# Patient Record
Sex: FEMALE | Race: WHITE
[De-identification: names, ages, dates, MRNs, and addresses within clinical notes are randomized per-mention and may not be internally consistent; named-entity substitution may affect disease eponyms.]

---

## 2017-01-04 ENCOUNTER — HOSPITAL ENCOUNTER (EMERGENCY)
Dept: HOSPITAL 10 - FTE | Age: 47
LOS: 1 days | Discharge: HOME | End: 2017-01-05
Payer: MEDICAID

## 2017-01-04 VITALS
HEIGHT: 62 IN | HEIGHT: 62 IN | WEIGHT: 153.22 LBS | BODY MASS INDEX: 28.2 KG/M2 | BODY MASS INDEX: 28.2 KG/M2 | WEIGHT: 153.22 LBS

## 2017-01-04 DIAGNOSIS — H66.91: Primary | ICD-10-CM

## 2017-01-04 PROCEDURE — 99283 EMERGENCY DEPT VISIT LOW MDM: CPT

## 2017-01-04 NOTE — ERD
ER Documentation


Chief Complaint


Date/Time


DATE: 1/4/17


Chief Complaint


Ear pain





HPI


The patient is a 46-year-old female who presents to the Emergency Department 

with complaint of bilateral ear pain.  She reports that her symptoms began 2.5 

weeks ago, with onset of right-sided ear pain, mild rhinorrhea, body aches, 

subjective tactile fevers, sore throat and increased eye lacrimation.  Since, 

her fevers, body aches and sore throat have improved.  However, she continues 

to experience right-sided ear pain, rhinorrhea and increased eye lacrimation.  

Two days ago, the patient also developed left ear pain.  She denies any cough. 

Denies otorrhea or bloody discharge.  Denies any pain to the external ear.  

Denies any large water exposure. Denies any change in hearing or dizziness.  

Denies neck pain/neck stiffness. Denies new rashes. Denies any sick contacts. 

The patient does state that she has had similar symptoms in the past, which 

resolved after taking antibiotics.





ROS


All systems reviewed and are negative except as per history of present illness.





Medications


Home Meds


Active Scripts


Loratadine* (Loratadine*) 10 Mg Tablet, 10 MG PO DAILY, #30 TAB


   Prov:ROMEL ASHER PA-C         1/4/17


Ibuprofen* (Motrin*) 600 Mg Tab, 600 MG PO Q6, #30 TAB


   Prov:ROMEL ASHER PA-C         1/4/17


Amoxicillin* (Amoxicillin*) 500 Mg Cap, 500 MG PO BID, #20 CAP


   Prov:ROMEL ASHER PA-C         1/4/17


Amoxicillin* (Amoxicillin*) 500 Mg Cap, 500 MG PO TID for 7 Days, CAP


   Prov:OMARI GARCIA PA-C         11/15/16


Phenylephrine/Dm/Acetaminop/GG (Sudafed PE Pressure+Pain+Cold) 1 Each Tablet, 1 

EACH PO BID, #10 TAB


   Prov:BRITTANY MELENDEZ NP         12/8/15


Dextromethorphan Hb-Promethazine Hcl (Promethazine DM Syrup) 180 Ml Syrup, 5 ML 

PO Q6H Y for COUGH, #4 OZ


   Prov:ISAURO CAPPS         9/23/15





Allergies


Allergies:  


Coded Allergies:  


     No Known Allergy (Unverified , 11/15/16)





PMhx/Soc


Hx Alcohol Use:  No


Hx Substance Use:  No


Hx Tobacco Use:  No





Physical Exam


Vitals





Vital Signs








  Date Time  Temp Pulse Resp B/P Pulse Ox O2 Delivery O2 Flow Rate FiO2


 


1/4/17 15:42 98.2 90 16 160/80 99   








Physical Exam


GENERAL: Well-developed, well-nourished, in no acute distress


HEENT: Head is normocephalic, atraumatic. No scleral pallor or icterus. Pupils 

equal, round and reactive to light. Extraocular movements intact. Conjunctiva 

pink. Nares are patent bilaterally. Right tympanic membrane is erythematous, 

with dulling of the light reflex, and mildly bulging.  Left tympanic membrane 

is clear, with dulling of the light reflex, but no significant erythema, no 

effusion.  No tenderness to palpation or manipulation of the tragus or pinna.  

No foreign bodies. No mastoid or preauricular tenderness. No otorrhea or bloody 

discharge. Moist mucous membranes. No pharyngeal erythema or exudates. 


NECK: Supple. No masses, no tenderness, no lymphadenopathy. Full range of 

motion.  No nuchal rigidity.


RESPIRATORY: Lungs are clear to auscultation bilaterally.


CARDIOVASCULAR:  Regular rate and rhythm. S1 and S2 normal.


EXTREMITIES: No clubbing, cyanosis, or edema. Distal pulses are palpable, 2+ 

bilaterally. Capillary refill is less than 2 seconds.


MUSCULOSKELETAL: No injury or deformities.


NEUROLOGIC: The patient is alert, awake, and oriented x 3. No focal neurologic 

deficits.  Gait is observed and normal.


INTEGUMENT:  Skin is clean, dry and intact.


PSYCHIATRIC:  Appropriate; Cooperative.





Procedures/MDM


This is a 46-year-old female presenting to the Emergency Department with 

complaint of bilateral ear pain.  On physical examination, the patients right 

tympanic membrane was erythematous and bulging.  Left tympanic membrane was 

noted to have dulling of the light reflex, though with no erythema or effusion. 

She had no mastoid tenderness, no preauricular tenderness.  Hearing is grossly 

intact.  No otorrhea or bloody discharge.  No tenderness to palpation or 

manipulation of tragus or pinna.  No foreign bodies were noted.  The 

differential diagnosis includes, but is not limited to, otitis media, otitis 

externa, ear foreign body, cerumen impaction, ruptured tympanic membrane, 

sinusitis, URI, tinnitus, mastoiditis, viral syndrome, cholesteatoma, auditory 

tube dysfunction, osteoma, referred pain, trauma, bullous myringitis, Mableton-

Hunt syndrome.  After rest, the patient has no new complaints.  





Upon my review and interpretation of the patient's presentation and overall ER 

course, I believe the patient's symptoms are most consistent with acute right 

otitis media, and left otalgia without evidence of infection.  At this time, 

the patient is in stable condition and therefore can be discharged home with a 

prescription for Amoxicillin, ibuprofen and Loratadine, and strict return 

precautions for signs of deteriorating or worsening condition.  The patient is 

instructed to follow up with her primary medical provider and an ENT specialist 

within 2-3 days for reevaluation and further management or to return to the ER 

sooner for any new or worsening symptoms.  Given that the patient has had 

multiple ear infections over the past several years, ENT referrals were 

provided.  I shared my medical decision making and plan with the patient at 

length and in great detail, and she verbally understands and agrees with the 

plan for further observation and care as an outpatient.  At the time of 

discharge, all questions were answered.





Departure


Diagnosis:  


 Primary Impression:  


 Acute right otitis media


 Additional Impression:  


 Otalgia of both ears


Condition:  Stable


Patient Instructions:  Anatomy of the Ear, Anatomy of the Inner Ear, Earache W/

O Infection (Adult), Otitis Media, Abx Tx (Adult)


Referrals:  


IDALMIS MESA MD, STEPHEN H MD





Additional Instructions:  


Follow up with your primary medical provider and an ENT specialist within 2-3 

days for reevaluation and further management. Return to the ED sooner for any 

new or worsening symptoms.











ROMEL ASHER PA-C Jan 4, 2017 16:39

## 2017-02-21 ENCOUNTER — HOSPITAL ENCOUNTER (EMERGENCY)
Dept: HOSPITAL 10 - FTE | Age: 47
Discharge: HOME | End: 2017-02-21
Payer: MEDICAID

## 2017-02-21 VITALS — TEMPERATURE: 98.2 F | HEART RATE: 68 BPM | SYSTOLIC BLOOD PRESSURE: 178 MMHG | DIASTOLIC BLOOD PRESSURE: 93 MMHG

## 2017-02-21 VITALS — BODY MASS INDEX: 34.59 KG/M2 | WEIGHT: 149.47 LBS | HEIGHT: 55 IN

## 2017-02-21 DIAGNOSIS — I10: ICD-10-CM

## 2017-02-21 DIAGNOSIS — R51: Primary | ICD-10-CM

## 2017-02-21 LAB
ANION GAP SERPL CALC-SCNC: 16 MMOL/L (ref 8–16)
APTT BLD: 22.7 SEC (ref 25–35)
BASOPHILS # BLD AUTO: 0.1 10^3/UL (ref 0–0.1)
BASOPHILS NFR BLD: 0.8 % (ref 0–2)
BUN SERPL-MCNC: 12 MG/DL (ref 7–20)
CALCIUM SERPL-MCNC: 8.7 MG/DL (ref 8.4–10.2)
CHLORIDE SERPL-SCNC: 103 MMOL/L (ref 97–110)
CO2 SERPL-SCNC: 27 MMOL/L (ref 21–31)
CONDITION: 1
CREAT SERPL-MCNC: 0.7 MG/DL (ref 0.44–1)
EOSINOPHIL # BLD: 0 10^3/UL (ref 0–0.5)
EOSINOPHIL NFR BLD: 0.1 % (ref 0–7)
ERYTHROCYTE [DISTWIDTH] IN BLOOD BY AUTOMATED COUNT: 14 % (ref 11.5–14.5)
GLUCOSE SERPL-MCNC: 140 MG/DL (ref 70–220)
HCT VFR BLD CALC: 41.9 % (ref 37–47)
HGB BLD-MCNC: 14.2 G/DL (ref 12–16)
INR PPP: 0.99
LYMPHOCYTES # BLD AUTO: 3.4 10^3/UL (ref 0.8–2.9)
LYMPHOCYTES NFR BLD AUTO: 21.8 % (ref 15–51)
MCH RBC QN AUTO: 31.3 PG (ref 29–33)
MCHC RBC AUTO-ENTMCNC: 33.9 G/DL (ref 32–37)
MCV RBC AUTO: 92.4 FL (ref 82–101)
MONOCYTES # BLD: 0.8 10^3/UL (ref 0.3–0.9)
MONOCYTES NFR BLD: 4.9 % (ref 0–11)
NEUTROPHILS # BLD: 11.2 10^3/UL (ref 1.6–7.5)
NEUTROPHILS NFR BLD AUTO: 72.4 % (ref 39–77)
NRBC # BLD MANUAL: 0 10^3/UL (ref 0–0)
NRBC BLD QL: 0 /100WBC (ref 0–0)
PLATELET # BLD: 246 10^3/UL (ref 140–440)
PMV BLD AUTO: 9.1 FL (ref 7.4–10.4)
POTASSIUM SERPL-SCNC: 3.3 MMOL/L (ref 3.5–5.1)
PROTHROMBIN TIME: 13.1 SEC (ref 12.2–14.2)
PT RATIO: 1
RBC # BLD AUTO: 4.54 10^6/UL (ref 4.2–5.4)
SODIUM SERPL-SCNC: 143 MMOL/L (ref 135–144)
WBC # BLD AUTO: 15.4 10^3/UL (ref 4.8–10.8)
WBC # BLD: 15.4 10^3/UL (ref 4.8–10.8)

## 2017-02-21 PROCEDURE — 96375 TX/PRO/DX INJ NEW DRUG ADDON: CPT

## 2017-02-21 PROCEDURE — 70552 MRI BRAIN STEM W/DYE: CPT

## 2017-02-21 PROCEDURE — 80048 BASIC METABOLIC PNL TOTAL CA: CPT

## 2017-02-21 PROCEDURE — 96361 HYDRATE IV INFUSION ADD-ON: CPT

## 2017-02-21 PROCEDURE — 96374 THER/PROPH/DIAG INJ IV PUSH: CPT

## 2017-02-21 PROCEDURE — 85610 PROTHROMBIN TIME: CPT

## 2017-02-21 PROCEDURE — 70551 MRI BRAIN STEM W/O DYE: CPT

## 2017-02-21 PROCEDURE — 85025 COMPLETE CBC W/AUTO DIFF WBC: CPT

## 2017-02-21 PROCEDURE — 70450 CT HEAD/BRAIN W/O DYE: CPT

## 2017-02-21 PROCEDURE — 85730 THROMBOPLASTIN TIME PARTIAL: CPT

## 2017-02-21 NOTE — RADRPT
PROCEDURE:   MRI Head with intravenous gadolinium

 

CLINICAL INDICATION:   Headache

 

TECHNIQUE:   Multiplanar multi sequence imaging was obtained through the head following intravenous 
gadolinium administration.

 

COMPARISON:   

Left facial palsy, and 13 mm lesions seen on previous noncontrast MRI

 

FINDINGS:

The ventricles are normal in size and there is no midline shift.

There is been no significant interval change to the 1.2 by 1.1 x 0.8 cm mass seen within the left la
teral sella which appears to bulge into the left cavernous sinus.  No significant post gadolinium en
hancement evident.  No other mass, no intracranial bleed and no extra-axial fluid collection is evid
ent.

The brainstem appears unremarkable.

Normal enhancement is seen within the central cerebral vasculature and within the dural sinuses and 
deep veins.

The visualized osseous elements appear unremarkable.

The orbits and orbital contents appear unremarkable.

The paranasal sinuses and mastoid air cells are well-aerated.

 

IMPRESSION: 

1.  Since the previous noncontrast study done earlier the same date there is been no significant int
erval change to the 1.2 x 1.1 x 0.8 cm mass seen within the left lateral sella and extending superio
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
ents a Rathke cleft cyst.  A pituitary adenoma is less likely.

2.  The mass bulges into the left cavernous sinus but the left carotid artery remains patent.

3.  Otherwise, stable and unremarkable MRI of the brain.

_____________________________________________ 

Physician Arvin           Date    Time 

Electronically viewed and signed by Physician Arvin on 02/21/2017 20:13 

 

D:  02/21/2017 20:13  T:  02/21/2017 20:13

/

## 2017-02-21 NOTE — RADRPT
PROCEDURE:  MRI Brain without contrast. 

 

CLINICAL INDICATION:  Headaches, left facial palsy 

 

TECHNIQUE:  Multiplanar MRI of the brain without contrast was performed on a 3.0 T scanner with the 
following sequences obtained:  T1-weighted, T2-weighted/FLAIR, diffusion weighted (with ADC map), GR
E.  

 

COMPARISON:  CT brain 02/21/2017 

 

FINDINGS:

No acute/recent ischemic infarction or intracranial hemorrhage / blood degradation products are iden
tified.  No extra-axial fluid collection is seen.  

 

There is no mass effect.  No midline shift is identified.

 

The ventricles and sulci are within normal limits for size and configuration.

 

A few punctate foci of increased T2-weighted FLAIR signal intensity are identified in the deep white
 matter which are nonspecific, which may reflect chronic small vessel ischemic changes or possibly s
equela of complicated migraines.

 

There is a focal homogeneous intermediate T1-weighted, decreased T2-weighted signal intensity lesion
 in the left aspect of the sella which may extend slightly into the left suprasellar cistern measuri
ng approximately 7 x 13 x 10 mm (AP x transverse x CC).  No associated fluid fluid level is seen.

 

Flow voids are identified in the proximal intracranial arteries and dural sinuses suggesting patency
.  

 

The mastoid air cells and paranasal sinuses are grossly clear.  

 

 

IMPRESSION:

1.  No evidence of acute intracranial pathology.

2.  Minimal nonspecific white matter changes, which may reflect chronic small vessel ischemic change
s, possibly sequela of complicated migraines.

3.  13 mm intermediate T1-weighted/decreased T2-weighted signal intensity lesion in the left sella w
hich may extend slightly into the suprasellar cistern.  Considerations include Rathke cleft cyst, le
ss likely hemorrhagic pituitary adenoma, but further characterization could be performed with follow
-up dedicated contrast-enhanced MRI of the pituitary.

 

 

RPTAT: TT

_____________________________________________ 

.Kal White MD, MD           Date    Time 

Electronically viewed and signed by .Kal White MD, MD on 02/21/2017 17:01 

 

D:  02/21/2017 17:01  T:  02/21/2017 17:01

.O/

## 2017-02-21 NOTE — ERD
ER Documentation


Chief Complaint


Date/Time


DATE: 2/21/17 


TIME: 19:34


Chief Complaint


non traumatic headache no fever since 5 days.L side facial palsy, on meds





HPI


A 46-year-old female with a past medical history of hypertension presents to 

the ED complaining of positional dizziness that led to a headache that started 

5 days ago.  States that she went to the [facility] and Dr. French diagnosed patient with a left-sided Bell's palsy.  Patient reports 

that she has been taking acyclovir, methylprednisolone, diclofenac, artificial 

tears without relief of her symptoms.  Reports that her headache is mainly in 

the frontal region and radiates to the back.  Rates the pain a 8 out of 10.  

Describes it as achy.  States that the pain is worse at night.  Reports that 

the pain is exacerbated when she bends forward.  Denies any facial pain.  

Denies any sinus pain.  Denies any photophobia, blurred vision, photophobia, 

weakness, abdominal pain, nausea, vomiting, chest pain, shortness of breath.  

Reports that her last menses was on February 14, 2017.





ROS


All systems reviewed and are negative except as per history of present illness.





Medications


Home Meds


Active Scripts


Loratadine* (Loratadine*) 10 Mg Tablet, 10 MG PO DAILY, #30 TAB


   Prov:RMOEL ASHER PA-C         1/4/17


Ibuprofen* (Motrin*) 600 Mg Tab, 600 MG PO Q6, #30 TAB


   Prov:ROMEL ASHER PA-C         1/4/17


Amoxicillin* (Amoxicillin*) 500 Mg Cap, 500 MG PO BID, #20 CAP


   Prov:ROMEL ASHER PA-C         1/4/17


Amoxicillin* (Amoxicillin*) 500 Mg Cap, 500 MG PO TID for 7 Days, CAP


   Prov:OMARI GARCIA PA-C         11/15/16


Phenylephrine/Dm/Acetaminop/GG (Sudafed PE Pressure+Pain+Cold) 1 Each Tablet, 1 

EACH PO BID, #10 TAB


   Prov:BRITTANY MELENDEZ NP         12/8/15


Dextromethorphan Hb-Promethazine Hcl (Promethazine DM Syrup) 180 Ml Syrup, 5 ML 

PO Q6H Y for COUGH, #4 OZ


   Prov:ISAURO CAPPS         9/23/15





Allergies


Allergies:  


Coded Allergies:  


     No Known Allergy (Unverified , 11/15/16)





PMhx/Soc


History of Surgery:  No


Anesthesia Reaction:  No


Hx Neurological Disorder:  No


Hx Respiratory Disorders:  No


Hx Cardiac Disorders:  No


Hx Psychiatric Problems:  No


Hx Miscellaneous Medical Probl:  Yes (HTN)


Hx Alcohol Use:  No


Hx Substance Use:  No


Hx Tobacco Use:  No


Smoking Status:  Never smoker





Physical Exam


Vitals





Vital Signs








  Date Time  Temp Pulse Resp B/P Pulse Ox O2 Delivery O2 Flow Rate FiO2


 


2/21/17 10:02 98.9 76 20 166/75 99   








Physical Exam


Const: Non-ill-appearing, well-nourished. In no acute distress.


Head: Atraumatic, normocephalic 


Eyes: Normal Conjunctiva without injection. No purulent discharge. PERRLA. EOMI 


ENT: Normal external ear. Ear canal without erythema. Tympanic membrane pearly 

gray without effusion or bulging. Nasal canal clear with normal turbinates. 

Moist oropharynx without tonsillar exudates. Non-erythematous pharynx. Uvula 

midline. No drooling.  No trismus. 


Neck: No cervical midline tenderness.  Full range of motion. No meningismus. No 

cervical lymphadenopathy. No JVD.


Resp: Clear to auscultation bilaterally. No wheezing, rhonchi, rales, or 

crackles. No accessory muscle use. No retractions.


Cardio: Regular rate and rhythm.  No murmurs, rubs or gallops.


Abd: Soft, non tender, non distended. Normal bowel sounds.  No palpable masses.

  No rebound tenderness.  No guarding.  Negative McBurney's Point. Negative 

Gauthier's Sign.


Skin: Normal skin turgor. No petechiae or rashes


Back: No midline tenderness. No CVA tenderness.


Ext: No cyanosis, or edema. Distal pulses intact bilaterally.


Neur: Awake and alert. Normal gait. Normal coordination. Cranial Nerves II- VII 

intact. Normal finger to nose. Muscle strength 5/5. Sensation intact.


Psych: Normal Mood and Affect


Result Diagram:  


2/21/17 1830 2/21/17 1830





Results 24 hrs





 Laboratory Tests








Test


  2/21/17


18:30


 


Activated Partial


Thromboplast Time 22.7Sec 


 


 


Anion Gap 16 


 


Basophils # 0.110^3/ul 


 


Basophils % 0.8% 


 


Blood Urea Nitrogen 12mg/dl 


 


Calcium Level 8.7mg/dl 


 


Carbon Dioxide Level 27mmol/L 


 


Chloride Level 103mmol/L 


 


Creatinine 0.70mg/dl 


 


Eosinophils # 0.010^3/ul 


 


Eosinophils % 0.1% 


 


Glucose Level 140mg/dl 


 


Hematocrit 41.9% 


 


Hemoglobin 14.2g/dl 


 


INR International Normalized


Ratio 0.99 


 


 


Lymphocytes # 3.410^3/ul 


 


Lymphocytes % 21.8% 


 


Mean Corpuscular Hemoglobin 31.3pg 


 


Mean Corpuscular Hemoglobin


Concent 33.9g/dl 


 


 


Mean Corpuscular Volume 92.4fl 


 


Mean Platelet Volume 9.1fl 


 


Monocytes # 0.810^3/ul 


 


Monocytes % 4.9% 


 


Neutrophils # 11.210^3/ul 


 


Neutrophils % 72.4% 


 


Nucleated Red Blood Cells # 0.010^3/ul 


 


Nucleated Red Blood Cells % 0.0/100WBC 


 


Platelet Count 89981^3/UL 


 


Potassium Level 3.3mmol/L 


 


Prothrombin Time 13.1Sec 


 


Prothrombin Time Ratio 1.0 


 


Red Blood Count 4.5410^6/ul 


 


Red Cell Distribution Width 14.0% 


 


Sodium Level 143mmol/L 


 


White Blood Count 15.410^3/ul 








 Current Medications








 Medications


  (Trade)  Dose


 Ordered  Sig/Giselle


 Route


 PRN Reason  Start Time


 Stop Time Status Last Admin


Dose Admin


 


 Sodium Chloride


  (NS)  1,000 ml @ 


 1,000 mls/hr  Q1H ONCE


 IV


   2/21/17 13:00


 2/21/17 13:59 DC 2/21/17 13:22


 


 


 Metoclopramide HCl


  (Reglan)  10 mg  ONCE  ONCE


 IV


   2/21/17 13:00


 2/21/17 13:01 DC 2/21/17 13:22


 


 


 Diphenhydramine


 HCl


  (Benadryl)  25 mg  ONCE  ONCE


 IV


   2/21/17 13:00


 2/21/17 13:01 DC 2/21/17 13:22


 











Procedures/MDM


46-year-old female with no significant past medical history presents to the ED 

complaining of a headache that started 5 days ago.  Patient is afebrile and 

nontoxic-appearing.  Patient was treated here in the ED with 1 L of normal 

saline, Benadryl, Reglan with relief of her headache.  Since patient has slight 

lowering of the left side of her mouth, was able to wrinkle her forehead as 

well as raise her eyebrow, a CT of the brain without contrast was ordered to 

further evaluate patient to rule out a stroke at this time.





PROCEDURE:   CT Brain without. 


 


CLINICAL INDICATION:  Headache, left facial palsy.


 


TECHNIQUE:   A CT of the brain was performed on multidetector high-resolution 

CT scanner utilizing axial sections from the skull base through the vertex 

without contrast.  The scan was reviewed in soft tissue brain and high 

frequency resolution bone algorithm windows.  Images were reviewed on a high-

resolution PACS workstation. One or more the following does reduction 

techniques were utilized: Automated exposure control, adjustment of the mA/ or 

kV according to patient's size, or use of iterative reconstruction technique. 

The exam CTDI and the DLP are not provided.


 


COMPARISON:  None available.


 


FINDINGS:


 


The ventricles and sulci are minimally prominent indicative of volume loss.  

There is no intracranial hemorrhage, mass effect or midline shift.  No abnormal 

intra-axial or extra-axial fluid collections are seen. The gray/white matter 

differentiation is preserved. Hyperostosis frontalis interna is noted.  The 

visualized paranasal sinuses are essentially clear. 


 


IMPRESSION:


 


1.  No acute intracranial hemorrhage, transcortical infarction or mass effect. 

Brain MRI can be obtained if clinical concern persists.


 


2.  Minimal generalized cerebral volume loss.





PROCEDURE:  MRI Brain without contrast. 


 


CLINICAL INDICATION:  Headaches, left facial palsy 


 


TECHNIQUE:  Multiplanar MRI of the brain without contrast was performed on a 

3.0 T scanner with the following sequences obtained:  T1-weighted, T2-weighted/

FLAIR, diffusion weighted (with ADC map), GRE.  


 


COMPARISON:  CT brain 02/21/2017 


 


FINDINGS:


No acute/recent ischemic infarction or intracranial hemorrhage / blood 

degradation products are identified.  No extra-axial fluid collection is seen.  


 


There is no mass effect.  No midline shift is identified.


 


The ventricles and sulci are within normal limits for size and configuration.


 


A few punctate foci of increased T2-weighted FLAIR signal intensity are 

identified in the deep white matter which are nonspecific, which may reflect 

chronic small vessel ischemic changes or possibly sequela of complicated 

migraines.


 


There is a focal homogeneous intermediate T1-weighted, decreased T2-weighted 

signal intensity lesion in the left aspect of the sella which may extend 

slightly into the left suprasellar cistern measuring approximately 7 x 13 x 10 

mm (AP x transverse x CC).  No associated fluid fluid level is seen.


 


Flow voids are identified in the proximal intracranial arteries and dural 

sinuses suggesting patency.  


 


The mastoid air cells and paranasal sinuses are grossly clear.  


 


 


IMPRESSION:


1.  No evidence of acute intracranial pathology.


2.  Minimal nonspecific white matter changes, which may reflect chronic small 

vessel ischemic changes, possibly sequela of complicated migraines.


3.  13 mm intermediate T1-weighted/decreased T2-weighted signal intensity 

lesion in the left sella which may extend slightly into the suprasellar 

cistern.  Considerations include Rathke cleft cyst, less likely hemorrhagic 

pituitary adenoma, but further characterization could be performed with follow-

up dedicated contrast-enhanced MRI of the pituitary.


 





CT of the brain without contrast recommended for a brain MRI to rule out a 

stroke that is unable to be seen on the CT of the brain without contrast.  This 

case was discussed with my supervising physician, Dr. singletary who recommended to 

obtain a MRI without contrast at this time.  The MRI of the brain showed patient

's symptoms could be possibly due to migraines however there is a 13 mm lesion 

noted in the left sella that could be possibly ran cleft cyst or a hemorrhagic 

pituitary adenoma that will be further evaluated with a MRI with contrast.  The 

MRI of the brain without contrast was discussed with my other supervising 

physician, Dr. Lock who recommended a CBC, BMP, PT, PTT was also ordered to 

further evaluate patient so she can receive contrast with her MRI at this time.

  The MRI with contrast will rule out a hemorrhagic pituitary adenoma.  No 

evidence of a stroke or TIA on the CT at this time.  Low suspicion for acute 

myocardial infarction, pneumothorax, pneumonia, cardiac tamponade, pulmonary 

embolism, pleural effusion, AAA, aortic dissection, Boerhaave's syndrome, 

cardiac dysrhythmias, meningitis, seizure, or other emergent conditions.  This 

patient has been signed out to my colleague Ananda Mayers PA-C pending the 

results of the MRI of the brain with contrast.





Departure


Diagnosis:  


 Primary Impression:  


 Headache


 Headache type:  unspecified  Headache chronicity pattern:  acute headache  

Intractability:  not intractable  Qualified Code:  R51 - Acute nonintractable 

headache, unspecified headache type


Condition:  Stable


Patient Instructions:  Headache, Unspecified


Referrals:  


COMM[facility]ITY CLINICS


YOU HAVE RECEIVED A MEDICAL SCREENING EXAM AND THE RESULTS INDICATE THAT YOU DO 

NOT HAVE A CONDITION THAT REQUIRES URGENT TREATMENT IN THE EMERGENCY DEPARTMENT.





FURTHER EVALUATION AND TREATMENT OF YOUR CONDITION CAN WAIT UNTIL YOU ARE SEEN 

IN YOUR DOCTORS OFFICE WITHIN THE NEXT 1-2 DAYS. IT IS YOUR RESPONSIBILITY TO 

MAKE AN APPOINTMENT FOR FOLOW-UP CARE.





IF YOU HAVE A PRIMARY DOCTOR


--you should call your primary doctor and schedule an appointment





IF YOU DO NOT HAVE A PRIMARY DOCTOR YOU CAN CALL OUR PHYSICIAN REFERRAL HOTLINE 

AT


 [phone] 





IF YOU CAN NOT AFFORD TO SEE A PHYSICIAN YOU CAN CHOSE FROM THE FOLLOWING 

[facility] [phone] [address] [facility]





[facility] [phone] [address] [facility]





[facility] [[email or web address]] [phone] [address] [facility] [phone] [facility] [phone] [address] [facility]. [facility] [phone] [address] [facility]. [facility]


YOU HAVE RECEIVED A MEDICAL SCREENING EXAM AND THE RESULTS INDICATE THAT YOU DO 

NOT HAVE A CONDITION THAT REQUIRES URGENT TREATMENT IN THE EMERGENCY DEPARTMENT.





FURTHER EVALUATION AND TREATMENT OF YOUR CONDITION CAN WAIT UNTIL YOU ARE SEEN 

IN YOUR DOCTORS OFFICE WITHIN THE NEXT 1-2 DAYS. IT IS YOUR RESPONSIBILITY TO 

MAKE AN APPOINTMENT FOR FOLOW-UP CARE.





IF YOU HAVE A PRIMARY DOCTOR


--you should call your primary doctor and schedule and appointment





IF YOU DO NOT HAVE A PRIMARY DOCTOR YOU CAN CALL OUR PHYSICIAN REFERRAL HOTLINE 

AT [phone].





IF YOU CAN NOT AFFORD TO SEE A PHYSICIAN YOU CAN CHOSE FROM THE FOLLOWING 

[facility]:





[facility]


[address] [place] [address]





[facility]


[address] [place] [address]





[facility]


[address] [place] [address]





[facility] URGENT CARE/SPECIALTIES





Additional Instructions:  


Visite a doyle johnathan madrigal para un EXAMEN.Regrese a estas instalaciones si no se 

mejora ed esperbamos o ed le demetris.











ELZA MONET PA-C Feb 21, 2017 19:46

## 2017-02-21 NOTE — RADRPT
PROCEDURE:   CT Brain without. 

 

CLINICAL INDICATION:  Headache, left facial palsy.

 

TECHNIQUE:   A CT of the brain was performed on multidetector high-resolution CT scanner utilizing a
xial sections from the skull base through the vertex without contrast.  The scan was reviewed in sof
t tissue brain and high frequency resolution bone algorithm windows.  Images were reviewed on a high
-resolution PACS workstation. One or more the following does reduction techniques were utilized: Aut
omated exposure control, adjustment of the mA/ or kV according to patient's size, or use of iterativ
e reconstruction technique. The exam CTDI and the DLP are not provided.

 

COMPARISON:  None available.

 

FINDINGS:

 

The ventricles and sulci are minimally prominent indicative of volume loss.  There is no intracrania
l hemorrhage, mass effect or midline shift.  No abnormal intra-axial or extra-axial fluid collection
s are seen. The gray/white matter differentiation is preserved. Hyperostosis frontalis interna is no
michael.  The visualized paranasal sinuses are essentially clear. 

 

IMPRESSION:

 

1.  No acute intracranial hemorrhage, transcortical infarction or mass effect. Brain MRI can be obta
ined if clinical concern persists.

 

2.  Minimal generalized cerebral volume loss.

 

 

RPTAT: HH

_____________________________________________ 

.Jack Campbell MD, MD           Date    Time 

Electronically viewed and signed by .Jack Campbell MD, MD on 02/21/2017 11:58 

 

D:  02/21/2017 11:58  T:  02/21/2017 11:58

.N/

## 2018-02-11 ENCOUNTER — HOSPITAL ENCOUNTER (EMERGENCY)
Age: 48
Discharge: HOME | End: 2018-02-11

## 2018-02-11 ENCOUNTER — HOSPITAL ENCOUNTER (EMERGENCY)
Dept: HOSPITAL 91 - E/R | Age: 48
Discharge: HOME | End: 2018-02-11
Payer: MEDICAID

## 2018-02-11 DIAGNOSIS — I10: ICD-10-CM

## 2018-02-11 DIAGNOSIS — J06.9: Primary | ICD-10-CM

## 2018-02-11 PROCEDURE — 99283 EMERGENCY DEPT VISIT LOW MDM: CPT
